# Patient Record
Sex: FEMALE | Race: WHITE | NOT HISPANIC OR LATINO | Employment: FULL TIME | URBAN - METROPOLITAN AREA
[De-identification: names, ages, dates, MRNs, and addresses within clinical notes are randomized per-mention and may not be internally consistent; named-entity substitution may affect disease eponyms.]

---

## 2017-05-31 ENCOUNTER — HISTORICAL (OUTPATIENT)
Dept: LAB | Facility: HOSPITAL | Age: 27
End: 2017-05-31

## 2017-11-30 ENCOUNTER — HISTORICAL (OUTPATIENT)
Dept: LAB | Facility: HOSPITAL | Age: 27
End: 2017-11-30

## 2017-11-30 LAB
APPEARANCE, UA: CLEAR
BACTERIA SPEC CULT: ABNORMAL /HPF
BILIRUB UR QL STRIP: NEGATIVE
COLOR UR: YELLOW
GLUCOSE (UA): NEGATIVE
HAV IGM SERPL QL IA: NEGATIVE
HBV CORE IGM SERPL QL IA: NEGATIVE
HBV SURFACE AG SERPL QL IA: NEGATIVE
HCV AB SERPL QL IA: NEGATIVE
HEPATITIS PANEL INTERP: NORMAL
HGB UR QL STRIP: NEGATIVE
HIV 1+2 AB+HIV1 P24 AG SERPL QL IA: NEGATIVE
KETONES UR QL STRIP: ABNORMAL
LEUKOCYTE ESTERASE UR QL STRIP: NEGATIVE
NITRITE UR QL STRIP: NEGATIVE
PH UR STRIP: 7 [PH] (ref 5–9)
PROT UR QL STRIP: NEGATIVE
RBC #/AREA URNS HPF: ABNORMAL /[HPF]
RPR SER QL: NORMAL
SP GR UR STRIP: 1.02 (ref 1–1.03)
SQUAMOUS EPITHELIAL, UA: ABNORMAL
UA WBC MAN: ABNORMAL
UROBILINOGEN UR STRIP-ACNC: 0.2
WBC #/AREA URNS HPF: ABNORMAL /[HPF]

## 2018-05-31 ENCOUNTER — HISTORICAL (OUTPATIENT)
Dept: ADMINISTRATIVE | Facility: HOSPITAL | Age: 28
End: 2018-05-31

## 2018-05-31 LAB
ABS NEUT (OLG): 2.38 X10(3)/MCL (ref 2.1–9.2)
ALBUMIN SERPL-MCNC: 3.5 GM/DL (ref 3.4–5)
ALBUMIN/GLOB SERPL: 1.1 {RATIO}
ALP SERPL-CCNC: 62 UNIT/L (ref 38–126)
ALT SERPL-CCNC: 35 UNIT/L (ref 12–78)
AST SERPL-CCNC: 27 UNIT/L (ref 15–37)
BASOPHILS # BLD AUTO: 0 X10(3)/MCL (ref 0–0.2)
BASOPHILS NFR BLD AUTO: 1 %
BILIRUB SERPL-MCNC: 0.4 MG/DL (ref 0.2–1)
BILIRUBIN DIRECT+TOT PNL SERPL-MCNC: 0.1 MG/DL (ref 0–0.2)
BILIRUBIN DIRECT+TOT PNL SERPL-MCNC: 0.3 MG/DL (ref 0–0.8)
BUN SERPL-MCNC: 14 MG/DL (ref 7–18)
CALCIUM SERPL-MCNC: 8.6 MG/DL (ref 8.5–10.1)
CHLORIDE SERPL-SCNC: 112 MMOL/L (ref 98–107)
CHOLEST SERPL-MCNC: 189 MG/DL (ref 0–200)
CHOLEST/HDLC SERPL: 3 {RATIO} (ref 0–4)
CO2 SERPL-SCNC: 28 MMOL/L (ref 21–32)
CREAT SERPL-MCNC: 0.9 MG/DL (ref 0.55–1.02)
EOSINOPHIL # BLD AUTO: 0.1 X10(3)/MCL (ref 0–0.9)
EOSINOPHIL NFR BLD AUTO: 2 %
ERYTHROCYTE [DISTWIDTH] IN BLOOD BY AUTOMATED COUNT: 11.8 % (ref 11.5–17)
EST. AVERAGE GLUCOSE BLD GHB EST-MCNC: 100 MG/DL
GLOBULIN SER-MCNC: 3.3 GM/DL (ref 2.4–3.5)
GLUCOSE SERPL-MCNC: 75 MG/DL (ref 74–106)
HBA1C MFR BLD: 5.1 % (ref 4.2–6.3)
HCT VFR BLD AUTO: 45 % (ref 37–47)
HDLC SERPL-MCNC: 63 MG/DL (ref 35–60)
HGB BLD-MCNC: 14.6 GM/DL (ref 12–16)
LDLC SERPL CALC-MCNC: 116 MG/DL (ref 0–129)
LYMPHOCYTES # BLD AUTO: 2 X10(3)/MCL (ref 0.6–4.6)
LYMPHOCYTES NFR BLD AUTO: 40 %
MCH RBC QN AUTO: 33.2 PG (ref 27–31)
MCHC RBC AUTO-ENTMCNC: 32.4 GM/DL (ref 33–36)
MCV RBC AUTO: 102.3 FL (ref 80–94)
MONOCYTES # BLD AUTO: 0.6 X10(3)/MCL (ref 0.1–1.3)
MONOCYTES NFR BLD AUTO: 11 %
NEUTROPHILS # BLD AUTO: 2.38 X10(3)/MCL (ref 2.1–9.2)
NEUTROPHILS NFR BLD AUTO: 46 %
PLATELET # BLD AUTO: 184 X10(3)/MCL (ref 130–400)
PMV BLD AUTO: 10 FL (ref 9.4–12.4)
POTASSIUM SERPL-SCNC: 4.4 MMOL/L (ref 3.5–5.1)
PROT SERPL-MCNC: 6.8 GM/DL (ref 6.4–8.2)
RBC # BLD AUTO: 4.4 X10(6)/MCL (ref 4.2–5.4)
SODIUM SERPL-SCNC: 145 MMOL/L (ref 136–145)
TRIGL SERPL-MCNC: 51 MG/DL (ref 30–150)
VLDLC SERPL CALC-MCNC: 10 MG/DL
WBC # SPEC AUTO: 5.2 X10(3)/MCL (ref 4.5–11.5)

## 2019-06-07 ENCOUNTER — HISTORICAL (OUTPATIENT)
Dept: ADMINISTRATIVE | Facility: HOSPITAL | Age: 29
End: 2019-06-07

## 2019-06-07 LAB
ABS NEUT (OLG): 4.82 X10(3)/MCL (ref 2.1–9.2)
ALBUMIN SERPL-MCNC: 3.9 GM/DL (ref 3.4–5)
ALBUMIN/GLOB SERPL: 1.1 RATIO (ref 1.1–2)
ALP SERPL-CCNC: 58 UNIT/L (ref 38–126)
ALT SERPL-CCNC: 26 UNIT/L (ref 12–78)
APPEARANCE, UA: CLEAR
AST SERPL-CCNC: 18 UNIT/L (ref 15–37)
BACTERIA SPEC CULT: ABNORMAL /HPF
BASOPHILS # BLD AUTO: 0.1 X10(3)/MCL (ref 0–0.2)
BASOPHILS NFR BLD AUTO: 1 %
BILIRUB SERPL-MCNC: 0.4 MG/DL (ref 0.2–1)
BILIRUB UR QL STRIP: NEGATIVE
BILIRUBIN DIRECT+TOT PNL SERPL-MCNC: 0.1 MG/DL (ref 0–0.5)
BILIRUBIN DIRECT+TOT PNL SERPL-MCNC: 0.3 MG/DL (ref 0–0.8)
BUN SERPL-MCNC: 12 MG/DL (ref 7–18)
CALCIUM SERPL-MCNC: 8.8 MG/DL (ref 8.5–10.1)
CHLORIDE SERPL-SCNC: 107 MMOL/L (ref 98–107)
CHOLEST SERPL-MCNC: 167 MG/DL (ref 0–200)
CHOLEST/HDLC SERPL: 2.2 {RATIO} (ref 0–4)
CO2 SERPL-SCNC: 29 MMOL/L (ref 21–32)
COLOR UR: YELLOW
CREAT SERPL-MCNC: 0.88 MG/DL (ref 0.55–1.02)
DEPRECATED CALCIDIOL+CALCIFEROL SERPL-MC: 46.18 NG/ML (ref 30–80)
EOSINOPHIL # BLD AUTO: 0 X10(3)/MCL (ref 0–0.9)
EOSINOPHIL NFR BLD AUTO: 1 %
ERYTHROCYTE [DISTWIDTH] IN BLOOD BY AUTOMATED COUNT: 11.2 % (ref 11.5–17)
EST. AVERAGE GLUCOSE BLD GHB EST-MCNC: 88 MG/DL
GLOBULIN SER-MCNC: 3.6 GM/DL (ref 2.4–3.5)
GLUCOSE (UA): NEGATIVE
GLUCOSE SERPL-MCNC: 79 MG/DL (ref 74–106)
HBA1C MFR BLD: 4.7 % (ref 4.2–6.3)
HCT VFR BLD AUTO: 43.3 % (ref 37–47)
HDLC SERPL-MCNC: 75 MG/DL (ref 35–60)
HGB BLD-MCNC: 14.3 GM/DL (ref 12–16)
HGB UR QL STRIP: NEGATIVE
KETONES UR QL STRIP: NEGATIVE
LDLC SERPL CALC-MCNC: 81 MG/DL (ref 0–129)
LEUKOCYTE ESTERASE UR QL STRIP: ABNORMAL
LYMPHOCYTES # BLD AUTO: 1.6 X10(3)/MCL (ref 0.6–4.6)
LYMPHOCYTES NFR BLD AUTO: 22 %
MCH RBC QN AUTO: 32.9 PG (ref 27–31)
MCHC RBC AUTO-ENTMCNC: 33 GM/DL (ref 33–36)
MCV RBC AUTO: 99.5 FL (ref 80–94)
MONOCYTES # BLD AUTO: 0.6 X10(3)/MCL (ref 0.1–1.3)
MONOCYTES NFR BLD AUTO: 8 %
NEUTROPHILS # BLD AUTO: 4.82 X10(3)/MCL (ref 2.1–9.2)
NEUTROPHILS NFR BLD AUTO: 68 %
NITRITE UR QL STRIP: NEGATIVE
PH UR STRIP: 6 [PH] (ref 5–9)
PLATELET # BLD AUTO: 190 X10(3)/MCL (ref 130–400)
PMV BLD AUTO: 10 FL (ref 9.4–12.4)
POTASSIUM SERPL-SCNC: 4.4 MMOL/L (ref 3.5–5.1)
PROT SERPL-MCNC: 7.5 GM/DL (ref 6.4–8.2)
PROT UR QL STRIP: NEGATIVE
RBC # BLD AUTO: 4.35 X10(6)/MCL (ref 4.2–5.4)
RBC #/AREA URNS HPF: ABNORMAL /[HPF]
SODIUM SERPL-SCNC: 140 MMOL/L (ref 136–145)
SP GR UR STRIP: 1.02 (ref 1–1.03)
SQUAMOUS EPITHELIAL, UA: ABNORMAL
TRIGL SERPL-MCNC: 53 MG/DL (ref 30–150)
TSH SERPL-ACNC: 1.15 MIU/L (ref 0.36–3.74)
UROBILINOGEN UR STRIP-ACNC: 0.2
VLDLC SERPL CALC-MCNC: 11 MG/DL
WBC # SPEC AUTO: 7.1 X10(3)/MCL (ref 4.5–11.5)
WBC #/AREA URNS HPF: ABNORMAL /[HPF]

## 2020-06-15 ENCOUNTER — HISTORICAL (OUTPATIENT)
Dept: ADMINISTRATIVE | Facility: HOSPITAL | Age: 30
End: 2020-06-15

## 2020-06-15 LAB
ABS NEUT (OLG): 1.88 X10(3)/MCL (ref 2.1–9.2)
ALBUMIN SERPL-MCNC: 4 GM/DL (ref 3.5–5)
ALBUMIN/GLOB SERPL: 1.3 RATIO (ref 1.1–2)
ALP SERPL-CCNC: 45 UNIT/L (ref 40–150)
ALT SERPL-CCNC: 16 UNIT/L (ref 0–55)
APPEARANCE, UA: CLEAR
AST SERPL-CCNC: 18 UNIT/L (ref 5–34)
BACTERIA SPEC CULT: ABNORMAL /HPF
BASOPHILS # BLD AUTO: 0 X10(3)/MCL (ref 0–0.2)
BASOPHILS NFR BLD AUTO: 1 %
BILIRUB SERPL-MCNC: 0.5 MG/DL
BILIRUB UR QL STRIP: NEGATIVE
BILIRUBIN DIRECT+TOT PNL SERPL-MCNC: 0.2 MG/DL (ref 0–0.5)
BILIRUBIN DIRECT+TOT PNL SERPL-MCNC: 0.3 MG/DL (ref 0–0.8)
BUN SERPL-MCNC: 9.6 MG/DL (ref 7–18.7)
CALCIUM SERPL-MCNC: 8.9 MG/DL (ref 8.4–10.2)
CHLORIDE SERPL-SCNC: 107 MMOL/L (ref 98–107)
CHOLEST SERPL-MCNC: 193 MG/DL
CHOLEST/HDLC SERPL: 3 {RATIO} (ref 0–5)
CO2 SERPL-SCNC: 25 MMOL/L (ref 22–29)
COLOR UR: YELLOW
CREAT SERPL-MCNC: 0.98 MG/DL (ref 0.55–1.02)
EOSINOPHIL # BLD AUTO: 0.1 X10(3)/MCL (ref 0–0.9)
EOSINOPHIL NFR BLD AUTO: 2 %
ERYTHROCYTE [DISTWIDTH] IN BLOOD BY AUTOMATED COUNT: 11.5 % (ref 11.5–17)
EST. AVERAGE GLUCOSE BLD GHB EST-MCNC: 88.2 MG/DL
GLOBULIN SER-MCNC: 3 GM/DL (ref 2.4–3.5)
GLUCOSE (UA): NEGATIVE
GLUCOSE SERPL-MCNC: 84 MG/DL (ref 74–100)
HBA1C MFR BLD: 4.7 %
HCT VFR BLD AUTO: 44.6 % (ref 37–47)
HDLC SERPL-MCNC: 57 MG/DL (ref 35–60)
HGB BLD-MCNC: 14.7 GM/DL (ref 12–16)
HGB UR QL STRIP: NEGATIVE
IMM GRANULOCYTES # BLD AUTO: 0 10*3/UL
IMM GRANULOCYTES NFR BLD AUTO: 0 %
KETONES UR QL STRIP: NEGATIVE
LDLC SERPL CALC-MCNC: 121 MG/DL (ref 50–140)
LEUKOCYTE ESTERASE UR QL STRIP: ABNORMAL
LYMPHOCYTES # BLD AUTO: 2 X10(3)/MCL (ref 0.6–4.6)
LYMPHOCYTES NFR BLD AUTO: 45 %
MCH RBC QN AUTO: 32.9 PG (ref 27–31)
MCHC RBC AUTO-ENTMCNC: 33 GM/DL (ref 33–36)
MCV RBC AUTO: 99.8 FL (ref 80–94)
MONOCYTES # BLD AUTO: 0.5 X10(3)/MCL (ref 0.1–1.3)
MONOCYTES NFR BLD AUTO: 10 %
NEUTROPHILS # BLD AUTO: 1.88 X10(3)/MCL (ref 2.1–9.2)
NEUTROPHILS NFR BLD AUTO: 42 %
NITRITE UR QL STRIP: NEGATIVE
PH UR STRIP: 5 [PH] (ref 5–9)
PLATELET # BLD AUTO: 187 X10(3)/MCL (ref 130–400)
PMV BLD AUTO: 10.1 FL (ref 9.4–12.4)
POTASSIUM SERPL-SCNC: 4.3 MMOL/L (ref 3.5–5.1)
PROT SERPL-MCNC: 7 GM/DL (ref 6.4–8.3)
PROT UR QL STRIP: NEGATIVE
RBC # BLD AUTO: 4.47 X10(6)/MCL (ref 4.2–5.4)
RBC #/AREA URNS HPF: ABNORMAL /[HPF]
SODIUM SERPL-SCNC: 140 MMOL/L (ref 136–145)
SP GR UR STRIP: 1.02 (ref 1–1.03)
SQUAMOUS EPITHELIAL, UA: ABNORMAL
TRIGL SERPL-MCNC: 77 MG/DL (ref 37–140)
TSH SERPL-ACNC: 1.65 UIU/ML (ref 0.35–4.94)
UROBILINOGEN UR STRIP-ACNC: 0.2
VIT B12 SERPL-MCNC: 350 PG/ML (ref 213–816)
VLDLC SERPL CALC-MCNC: 15 MG/DL
WBC # SPEC AUTO: 4.5 X10(3)/MCL (ref 4.5–11.5)
WBC #/AREA URNS HPF: ABNORMAL /[HPF]

## 2021-06-14 ENCOUNTER — HISTORICAL (OUTPATIENT)
Dept: ADMINISTRATIVE | Facility: HOSPITAL | Age: 31
End: 2021-06-14

## 2022-04-07 ENCOUNTER — HISTORICAL (OUTPATIENT)
Dept: ADMINISTRATIVE | Facility: HOSPITAL | Age: 32
End: 2022-04-07

## 2022-04-23 VITALS
DIASTOLIC BLOOD PRESSURE: 54 MMHG | HEIGHT: 68 IN | SYSTOLIC BLOOD PRESSURE: 98 MMHG | WEIGHT: 140.63 LBS | OXYGEN SATURATION: 97 % | BODY MASS INDEX: 21.31 KG/M2

## 2022-06-13 ENCOUNTER — TELEPHONE (OUTPATIENT)
Dept: INTERNAL MEDICINE | Facility: CLINIC | Age: 32
End: 2022-06-13

## 2022-06-13 DIAGNOSIS — Z13.21 ENCOUNTER FOR VITAMIN DEFICIENCY SCREENING: ICD-10-CM

## 2022-06-13 DIAGNOSIS — Z13.29 SCREENING FOR HYPOTHYROIDISM: ICD-10-CM

## 2022-06-13 DIAGNOSIS — Z00.00 WELL ADULT EXAM: Primary | ICD-10-CM

## 2022-06-13 NOTE — TELEPHONE ENCOUNTER
----- Message from Carlitos Coughlin MA sent at 6/13/2022  9:00 AM CDT -----  Regarding: PV 6/20/22 @ 3:40 Dr. Lora  1. Are there any outstanding tasks in the patient's chart? Yes, fasting labs    2. Is there any documentation in the chart? No    3.Has patient been seen in an ER, Urgent care clinic, or been admitted since last visit?  If yes, When, where, and why    4. Has patient seen any other healthcare providers since last visit?  If yes, when, where, and why    5. Has patient had any bloodwork or XR done since last visit?    6. Is patient signed up for patient portal?

## 2022-06-17 RX ORDER — DAPSONE 75 MG/G
GEL TOPICAL DAILY
COMMUNITY
Start: 2021-06-16

## 2022-06-17 RX ORDER — KETOCONAZOLE 20 MG/G
CREAM TOPICAL 2 TIMES DAILY
COMMUNITY
Start: 2021-06-16

## 2022-06-17 RX ORDER — AZELAIC ACID 0.15 G/G
GEL TOPICAL 2 TIMES DAILY
COMMUNITY
Start: 2021-06-16

## 2022-06-20 ENCOUNTER — OFFICE VISIT (OUTPATIENT)
Dept: INTERNAL MEDICINE | Facility: CLINIC | Age: 32
End: 2022-06-20
Payer: COMMERCIAL

## 2022-06-20 VITALS
HEART RATE: 67 BPM | DIASTOLIC BLOOD PRESSURE: 74 MMHG | OXYGEN SATURATION: 98 % | TEMPERATURE: 98 F | RESPIRATION RATE: 14 BRPM | BODY MASS INDEX: 21.8 KG/M2 | HEIGHT: 68 IN | WEIGHT: 143.88 LBS | SYSTOLIC BLOOD PRESSURE: 118 MMHG

## 2022-06-20 DIAGNOSIS — R47.01 APHASIA: ICD-10-CM

## 2022-06-20 DIAGNOSIS — G90.9 AUTONOMIC DYSFUNCTION: ICD-10-CM

## 2022-06-20 DIAGNOSIS — E55.9 VITAMIN D DEFICIENCY: ICD-10-CM

## 2022-06-20 DIAGNOSIS — Z00.00 ANNUAL PHYSICAL EXAM: Primary | ICD-10-CM

## 2022-06-20 DIAGNOSIS — G45.9 TRANSIENT CEREBRAL ISCHEMIA, UNSPECIFIED TYPE: ICD-10-CM

## 2022-06-20 PROCEDURE — 3074F SYST BP LT 130 MM HG: CPT | Mod: CPTII,,, | Performed by: INTERNAL MEDICINE

## 2022-06-20 PROCEDURE — 3078F PR MOST RECENT DIASTOLIC BLOOD PRESSURE < 80 MM HG: ICD-10-PCS | Mod: CPTII,,, | Performed by: INTERNAL MEDICINE

## 2022-06-20 PROCEDURE — 99395 PR PREVENTIVE VISIT,EST,18-39: ICD-10-PCS | Mod: ,,, | Performed by: INTERNAL MEDICINE

## 2022-06-20 PROCEDURE — 3078F DIAST BP <80 MM HG: CPT | Mod: CPTII,,, | Performed by: INTERNAL MEDICINE

## 2022-06-20 PROCEDURE — 3008F BODY MASS INDEX DOCD: CPT | Mod: CPTII,,, | Performed by: INTERNAL MEDICINE

## 2022-06-20 PROCEDURE — 3008F PR BODY MASS INDEX (BMI) DOCUMENTED: ICD-10-PCS | Mod: CPTII,,, | Performed by: INTERNAL MEDICINE

## 2022-06-20 PROCEDURE — 1159F PR MEDICATION LIST DOCUMENTED IN MEDICAL RECORD: ICD-10-PCS | Mod: CPTII,,, | Performed by: INTERNAL MEDICINE

## 2022-06-20 PROCEDURE — 99395 PREV VISIT EST AGE 18-39: CPT | Mod: ,,, | Performed by: INTERNAL MEDICINE

## 2022-06-20 PROCEDURE — 1160F RVW MEDS BY RX/DR IN RCRD: CPT | Mod: CPTII,,, | Performed by: INTERNAL MEDICINE

## 2022-06-20 PROCEDURE — 3074F PR MOST RECENT SYSTOLIC BLOOD PRESSURE < 130 MM HG: ICD-10-PCS | Mod: CPTII,,, | Performed by: INTERNAL MEDICINE

## 2022-06-20 PROCEDURE — 1159F MED LIST DOCD IN RCRD: CPT | Mod: CPTII,,, | Performed by: INTERNAL MEDICINE

## 2022-06-20 PROCEDURE — 1160F PR REVIEW ALL MEDS BY PRESCRIBER/CLIN PHARMACIST DOCUMENTED: ICD-10-PCS | Mod: CPTII,,, | Performed by: INTERNAL MEDICINE

## 2022-06-20 RX ORDER — FLUDROCORTISONE ACETATE 0.1 MG/1
100 TABLET ORAL 2 TIMES DAILY
COMMUNITY
Start: 2022-03-23

## 2022-06-20 RX ORDER — LEVONORGESTREL AND ETHINYL ESTRADIOL 0.1-0.02MG
1 KIT ORAL DAILY
COMMUNITY
Start: 2022-06-10 | End: 2024-03-21

## 2022-06-20 RX ORDER — DOXYCYCLINE 100 MG/1
100 CAPSULE ORAL DAILY
COMMUNITY
Start: 2022-01-18

## 2022-06-20 RX ORDER — DEXTROAMPHETAMINE SACCHARATE, AMPHETAMINE ASPARTATE MONOHYDRATE, DEXTROAMPHETAMINE SULFATE AND AMPHETAMINE SULFATE 3.75; 3.75; 3.75; 3.75 MG/1; MG/1; MG/1; MG/1
15 CAPSULE, EXTENDED RELEASE ORAL DAILY
COMMUNITY
End: 2023-06-28

## 2022-06-20 NOTE — PROGRESS NOTES
Subjective:      Patient ID: Huyen Dover is a 31 y.o. female.    Chief Complaint: Annual Exam      HPI:    Huyen is a 31-year-old  female  She works as a  at [employer]; in documentation.  Otherwise her parents are alive her dad is 62 with prostate cancer and has had prostatectomy, radiation.   Mom has history of hyperlipidemia, alcoholic, sober living  Brother no medical problems  Avni; autonomic dysfunction. She is now on Florinef 0.1 mg once a day as needed  [facility] for Rosacea  Had COVID last year  COVID vaccinated  MCV at 99; drinks 1-2 glasses of wine a week  [facility] [facility]  She complains of difficulty with expressive aphasia that she will often have the word and that it on the tip of her tongue with the wrong word will come out.  She is under lot of stress, on her mother is living in a sober living situation and she is planning a wedding.  She has had COVID twice.      Problem List Items Addressed This Visit        Neuro    Aphasia    Relevant Orders    Iron    Vitamin B12    Folate    TSH    Autonomic dysfunction    Relevant Orders    Iron    Vitamin B12    Folate       Endocrine    Vitamin D deficiency    Relevant Orders    Vitamin D       Other    Annual physical exam - Primary    Current Assessment & Plan     General health maintenance education given, labs ordered  Remainder of age-appropriate exams are up-to-date             Other Visit Diagnoses     Transient cerebral ischemia, unspecified type        Relevant Orders    CT Head Without Contrast              Past Medical History:  Past Medical History:   Diagnosis Date    ADD (attention deficit disorder)     Dizziness      History reviewed. No pertinent surgical history.  Review of patient's allergies indicates:  No Known Allergies  Current Outpatient Medications on File Prior to Visit   Medication Sig Dispense Refill    azelaic acid (AZELEX) 15 % gel Apply topically 2 (two) times a day.      dapsone  (ACZONE) 7.5 % GlwP Apply topically once daily.      doxycycline (VIBRAMYCIN) 100 MG Cap Take 100 mg by mouth once daily.      fludrocortisone (FLORINEF) 0.1 mg Tab Take 100 mcg by mouth 2 (two) times daily.      ketoconazole (NIZORAL) 2 % cream Apply topically 2 (two) times a day.      LESSINA 0.1-20 mg-mcg per tablet Take 1 tablet by mouth once daily.      dextroamphetamine-amphetamine (ADDERALL XR) 15 MG 24 hr capsule Take 15 mg by mouth Daily.       No current facility-administered medications on file prior to visit.     Social History     Socioeconomic History    Marital status: Unknown   Tobacco Use    Smoking status: Never Smoker    Smokeless tobacco: Never Used   Substance and Sexual Activity    Alcohol use: Yes     Comment: Occassionally    Drug use: Never    Sexual activity: Yes     Partners: Male     Family History   Problem Relation Age of Onset    Alcohol abuse Mother     Depression Mother     Hypertension Father     Hyperlipidemia Father     Prostate cancer Father            Review of Systems  Constitutional: No fever,  no fatigue, no chills, no night sweats, no weight gain, no weight loss, no changes in appetite.   Eye: No redness, no acute vision loss, no blurred vision, no double vision, no eye pain  ENMT: No sore throat, no nasal drainage, no nose bleeds,  no headache, no ear pain, no ear drainage, no acute hearing loss  Respiratory: No cough, no sputum production, no shortness of breath, no hemoptysis, no wheezing.  Cardiovascular: No chest pain, no chest tightness, no MORALES, no PND, no orthopnea, no swelling, no palpitations.  Gastrointestinal: No abdominal pain, no nausea, no vomiting, no diarrhea, no constipation, no difficulty swallowing, no change in bowel habits, no rectal bleeding  Genitourinary: no urgency, no frequency, no burning or pain when urinating, no blood in urine, no incontinence  Heme/Lymph: No easy bruising and/or bleeding, no swollen or painful  "glands.  Endocrine: No polyuria, no polydipsia, no polyphagia, no heat or cold intolerance.  Musculoskeletal: No muscle pain, no muscle weakness, no joint pain, no red or swollen joints.  Integumentary: No rash, no pruritis, no hair or nail changes.  Neurologic: No dizziness, no fainting, no tremors, no tingling and/ or numbness.  Psychiatric: No anxiety, no depression, no memory loss  All Other ROS: Negative with exception of what is documented in the history of present illness     Objective:   /74 (BP Location: Left arm, Patient Position: Sitting, BP Method: Medium (Manual))   Pulse 67   Temp 98.1 °F (36.7 °C) (Temporal)   Resp 14   Ht 5' 8" (1.727 m)   Wt 65.3 kg (143 lb 14.4 oz)   LMP 06/15/2022   SpO2 98%   BMI 21.88 kg/m²     Physical Exam  General : Alert and oriented, No acute distress, well, developed, well nourished, afebrile   Eye : PERRLA. EOMI.   HEENT : Normocephalic. Neck supple. Normal hearing. Oral mucosa is moist.  Respiratory : Lungs are clear to auscultation bilaterally, non-labored. Symmetrical chest wall expansion. No crackles, wheeze, or rhonci.  Cardiovascular : Normal rate, Regular rhythm. No murmurs, rubs, or gallops. Pulses 2+ in all extremities. No Edema.  Gastrointestinal : Soft, nontender, non-distended, bowel sounds normal, no organomegaly, no guarding, no rebound.  Musculoskeletal : Normal ROM.  No muscle tenderness.  Integumentary : Warm to touch. Intact. No rash.    Neurologic : Alert and oriented. No focal deficits.   Psychiatric : Cooperative, Appropriate mood & affect. Normal judgment.          Assessment:     1. Annual physical exam    2. Transient cerebral ischemia, unspecified type    3. Aphasia    4. Autonomic dysfunction    5. Vitamin D deficiency                  Plan:       I am having CARRIE Dover maintain her azelaic acid, dapsone, ketoconazole, dextroamphetamine-amphetamine, fludrocortisone, LESSINA, and doxycycline.      Problem List Items Addressed " This Visit        Neuro    Aphasia    Relevant Orders    Iron    Vitamin B12    Folate    TSH    Autonomic dysfunction    Relevant Orders    Iron    Vitamin B12    Folate       Endocrine    Vitamin D deficiency    Relevant Orders    Vitamin D       Other    Annual physical exam - Primary     General health maintenance education given, labs ordered  Remainder of age-appropriate exams are up-to-date             Other Visit Diagnoses     Transient cerebral ischemia, unspecified type        Relevant Orders    CT Head Without Contrast            Huyen was seen today for annual exam.    Diagnoses and all orders for this visit:    Annual physical exam    Transient cerebral ischemia, unspecified type  -     CT Head Without Contrast; Future    Aphasia  -     Iron; Future  -     Vitamin B12; Future  -     Folate; Future  -     TSH; Future    Autonomic dysfunction  -     Iron; Future  -     Vitamin B12; Future  -     Folate; Future    Vitamin D deficiency  -     Vitamin D; Future               [unfilled]  Orders Placed This Encounter   Procedures    CT Head Without Contrast     Standing Status:   Future     Standing Expiration Date:   6/20/2023     Order Specific Question:   May the Radiologist modify the order per protocol to meet the clinical needs of the patient?     Answer:   Yes    Iron     Standing Status:   Future     Standing Expiration Date:   8/19/2023    Vitamin B12     Standing Status:   Future     Standing Expiration Date:   8/19/2023    Folate     Standing Status:   Future     Standing Expiration Date:   8/19/2023    TSH     Standing Status:   Future     Standing Expiration Date:   8/19/2023    Vitamin D     Standing Status:   Future     Standing Expiration Date:   8/19/2023       Medication List with Changes/Refills   Current Medications    AZELAIC ACID (AZELEX) 15 % GEL    Apply topically 2 (two) times a day.    DAPSONE (ACZONE) 7.5 % GLWP    Apply topically once daily.    DEXTROAMPHETAMINE-AMPHETAMINE  (ADDERALL XR) 15 MG 24 HR CAPSULE    Take 15 mg by mouth Daily.    DOXYCYCLINE (VIBRAMYCIN) 100 MG CAP    Take 100 mg by mouth once daily.    FLUDROCORTISONE (FLORINEF) 0.1 MG TAB    Take 100 mcg by mouth 2 (two) times daily.    KETOCONAZOLE (NIZORAL) 2 % CREAM    Apply topically 2 (two) times a day.    LESSINA 0.1-20 MG-MCG PER TABLET    Take 1 tablet by mouth once daily.      Medication List with Changes/Refills   Current Medications    AZELAIC ACID (AZELEX) 15 % GEL    Apply topically 2 (two) times a day.       Start Date: 6/16/2021 End Date: --    DAPSONE (ACZONE) 7.5 % GLWP    Apply topically once daily.       Start Date: 6/16/2021 End Date: --    DEXTROAMPHETAMINE-AMPHETAMINE (ADDERALL XR) 15 MG 24 HR CAPSULE    Take 15 mg by mouth Daily.       Start Date: --        End Date: --    DOXYCYCLINE (VIBRAMYCIN) 100 MG CAP    Take 100 mg by mouth once daily.       Start Date: 1/18/2022 End Date: --    FLUDROCORTISONE (FLORINEF) 0.1 MG TAB    Take 100 mcg by mouth 2 (two) times daily.       Start Date: 3/23/2022 End Date: --    KETOCONAZOLE (NIZORAL) 2 % CREAM    Apply topically 2 (two) times a day.       Start Date: 6/16/2021 End Date: --    LESSINA 0.1-20 MG-MCG PER TABLET    Take 1 tablet by mouth once daily.       Start Date: 6/10/2022 End Date: --            Follow up in 1 year (on 6/20/2023) for with labs prior to visit, WELLNESS.

## 2022-06-20 NOTE — ASSESSMENT & PLAN NOTE
General health maintenance education given, labs ordered  Remainder of age-appropriate exams are up-to-date

## 2022-06-28 ENCOUNTER — TELEPHONE (OUTPATIENT)
Dept: INTERNAL MEDICINE | Facility: CLINIC | Age: 32
End: 2022-06-28
Payer: COMMERCIAL

## 2022-06-28 ENCOUNTER — HOSPITAL ENCOUNTER (OUTPATIENT)
Dept: RADIOLOGY | Facility: HOSPITAL | Age: 32
Discharge: HOME | End: 2022-06-28
Attending: INTERNAL MEDICINE
Payer: COMMERCIAL

## 2022-06-28 DIAGNOSIS — G45.9 TRANSIENT CEREBRAL ISCHEMIA, UNSPECIFIED TYPE: ICD-10-CM

## 2022-06-28 PROCEDURE — 70450 CT HEAD/BRAIN W/O DYE: CPT | Mod: TC

## 2022-06-28 NOTE — TELEPHONE ENCOUNTER
----- Message from Ruddy Tavera MD sent at 6/28/2022 10:52 AM CDT -----  Your labs have been reviewed.  They are excellent.    Continue current treatment plan and follow up as scheduled.    Any other lab values out of the normal reference range are clinically insignificant and require no further work up.       Thank you,  Dr. Ruddy Lora

## 2022-06-28 NOTE — TELEPHONE ENCOUNTER
----- Message from Ruddy Tavera MD sent at 6/28/2022 10:52 AM CDT -----  CT of the head reviewed with no acute intracranial findings.

## 2022-09-19 PROBLEM — Z00.00 ANNUAL PHYSICAL EXAM: Status: RESOLVED | Noted: 2022-06-20 | Resolved: 2022-09-19

## 2023-06-21 ENCOUNTER — TELEPHONE (OUTPATIENT)
Dept: INTERNAL MEDICINE | Facility: CLINIC | Age: 33
End: 2023-06-21
Payer: COMMERCIAL

## 2023-06-21 DIAGNOSIS — Z00.00 WELLNESS EXAMINATION: Primary | ICD-10-CM

## 2023-06-21 DIAGNOSIS — E55.9 VITAMIN D DEFICIENCY: ICD-10-CM

## 2023-06-21 DIAGNOSIS — Z13.29 SCREENING FOR HYPOTHYROIDISM: ICD-10-CM

## 2023-06-21 NOTE — TELEPHONE ENCOUNTER
----- Message from Carlitos Coughlin MA sent at 6/21/2023 10:50 AM CDT -----  Regarding: PV 6/28/23 @ 3:40 Dr. Lora  1. Are there any outstanding tasks in the patient's chart? Yes, fasting labs    2. Is there any documentation in the chart? No    3.Has patient been seen in an ER, Urgent care clinic, or been admitted since last visit?  If yes, When, where, and why    4. Has patient seen any other healthcare providers since last visit?  If yes, when, where, and why    5. Has patient had any bloodwork or XR done since last visit?    6. Is patient signed up for patient portal?

## 2023-06-27 ENCOUNTER — LAB VISIT (OUTPATIENT)
Dept: LAB | Facility: HOSPITAL | Age: 33
End: 2023-06-27
Attending: INTERNAL MEDICINE
Payer: COMMERCIAL

## 2023-06-27 DIAGNOSIS — E55.9 VITAMIN D DEFICIENCY: ICD-10-CM

## 2023-06-27 DIAGNOSIS — Z13.29 SCREENING FOR HYPOTHYROIDISM: ICD-10-CM

## 2023-06-27 DIAGNOSIS — Z00.00 WELLNESS EXAMINATION: ICD-10-CM

## 2023-06-27 LAB
ALBUMIN SERPL-MCNC: 4 G/DL (ref 3.5–5)
ALBUMIN/GLOB SERPL: 1.5 RATIO (ref 1.1–2)
ALP SERPL-CCNC: 53 UNIT/L (ref 40–150)
ALT SERPL-CCNC: 19 UNIT/L (ref 0–55)
APPEARANCE UR: ABNORMAL
AST SERPL-CCNC: 19 UNIT/L (ref 5–34)
BACTERIA #/AREA URNS AUTO: ABNORMAL /HPF
BASOPHILS # BLD AUTO: 0.04 X10(3)/MCL
BASOPHILS NFR BLD AUTO: 0.8 %
BILIRUB UR QL STRIP.AUTO: NEGATIVE MG/DL
BILIRUBIN DIRECT+TOT PNL SERPL-MCNC: 0.5 MG/DL
BUN SERPL-MCNC: 11.9 MG/DL (ref 7–18.7)
CALCIUM SERPL-MCNC: 9.3 MG/DL (ref 8.4–10.2)
CHLORIDE SERPL-SCNC: 107 MMOL/L (ref 98–107)
CHOLEST SERPL-MCNC: 174 MG/DL
CHOLEST/HDLC SERPL: 3 {RATIO} (ref 0–5)
CO2 SERPL-SCNC: 26 MMOL/L (ref 22–29)
COLOR UR: ABNORMAL
CREAT SERPL-MCNC: 0.94 MG/DL (ref 0.55–1.02)
DEPRECATED CALCIDIOL+CALCIFEROL SERPL-MC: 43.5 NG/ML (ref 30–80)
EOSINOPHIL # BLD AUTO: 0.1 X10(3)/MCL (ref 0–0.9)
EOSINOPHIL NFR BLD AUTO: 2.1 %
ERYTHROCYTE [DISTWIDTH] IN BLOOD BY AUTOMATED COUNT: 11.5 % (ref 11.5–17)
EST. AVERAGE GLUCOSE BLD GHB EST-MCNC: 88.2 MG/DL
GFR SERPLBLD CREATININE-BSD FMLA CKD-EPI: >60 MLS/MIN/1.73/M2
GLOBULIN SER-MCNC: 2.6 GM/DL (ref 2.4–3.5)
GLUCOSE SERPL-MCNC: 83 MG/DL (ref 74–100)
GLUCOSE UR QL STRIP.AUTO: NEGATIVE MG/DL
HBA1C MFR BLD: 4.7 %
HCT VFR BLD AUTO: 43.6 % (ref 37–47)
HDLC SERPL-MCNC: 59 MG/DL (ref 35–60)
HGB BLD-MCNC: 14.4 G/DL (ref 12–16)
IMM GRANULOCYTES # BLD AUTO: 0.01 X10(3)/MCL (ref 0–0.04)
IMM GRANULOCYTES NFR BLD AUTO: 0.2 %
KETONES UR QL STRIP.AUTO: NEGATIVE MG/DL
LDLC SERPL CALC-MCNC: 103 MG/DL (ref 50–140)
LEUKOCYTE ESTERASE UR QL STRIP.AUTO: ABNORMAL UNIT/L
LYMPHOCYTES # BLD AUTO: 1.88 X10(3)/MCL (ref 0.6–4.6)
LYMPHOCYTES NFR BLD AUTO: 38.6 %
MCH RBC QN AUTO: 32.8 PG (ref 27–31)
MCHC RBC AUTO-ENTMCNC: 33 G/DL (ref 33–36)
MCV RBC AUTO: 99.3 FL (ref 80–94)
MONOCYTES # BLD AUTO: 0.43 X10(3)/MCL (ref 0.1–1.3)
MONOCYTES NFR BLD AUTO: 8.8 %
NEUTROPHILS # BLD AUTO: 2.41 X10(3)/MCL (ref 2.1–9.2)
NEUTROPHILS NFR BLD AUTO: 49.5 %
NITRITE UR QL STRIP.AUTO: NEGATIVE
NRBC BLD AUTO-RTO: 0 %
PH UR STRIP.AUTO: 5.5 [PH]
PLATELET # BLD AUTO: 180 X10(3)/MCL (ref 130–400)
PMV BLD AUTO: 10.1 FL (ref 7.4–10.4)
POTASSIUM SERPL-SCNC: 4.3 MMOL/L (ref 3.5–5.1)
PROT SERPL-MCNC: 6.6 GM/DL (ref 6.4–8.3)
PROT UR QL STRIP.AUTO: NEGATIVE MG/DL
RBC # BLD AUTO: 4.39 X10(6)/MCL (ref 4.2–5.4)
RBC #/AREA URNS AUTO: <5 /HPF
RBC UR QL AUTO: NEGATIVE UNIT/L
SODIUM SERPL-SCNC: 140 MMOL/L (ref 136–145)
SP GR UR STRIP.AUTO: 1.02 (ref 1–1.03)
SQUAMOUS #/AREA URNS AUTO: 6 /HPF
TRIGL SERPL-MCNC: 60 MG/DL (ref 37–140)
TSH SERPL-ACNC: 2.03 UIU/ML (ref 0.35–4.94)
UROBILINOGEN UR STRIP-ACNC: 0.2 MG/DL
VLDLC SERPL CALC-MCNC: 12 MG/DL
WBC # SPEC AUTO: 4.87 X10(3)/MCL (ref 4.5–11.5)
WBC #/AREA URNS AUTO: ABNORMAL /HPF

## 2023-06-27 PROCEDURE — 80053 COMPREHEN METABOLIC PANEL: CPT

## 2023-06-27 PROCEDURE — 87088 URINE BACTERIA CULTURE: CPT

## 2023-06-27 PROCEDURE — 85025 COMPLETE CBC W/AUTO DIFF WBC: CPT

## 2023-06-27 PROCEDURE — 81001 URINALYSIS AUTO W/SCOPE: CPT

## 2023-06-27 PROCEDURE — 36415 COLL VENOUS BLD VENIPUNCTURE: CPT

## 2023-06-27 PROCEDURE — 84443 ASSAY THYROID STIM HORMONE: CPT

## 2023-06-27 PROCEDURE — 80061 LIPID PANEL: CPT

## 2023-06-27 PROCEDURE — 82306 VITAMIN D 25 HYDROXY: CPT

## 2023-06-27 PROCEDURE — 83036 HEMOGLOBIN GLYCOSYLATED A1C: CPT

## 2023-06-28 ENCOUNTER — OFFICE VISIT (OUTPATIENT)
Dept: INTERNAL MEDICINE | Facility: CLINIC | Age: 33
End: 2023-06-28
Payer: COMMERCIAL

## 2023-06-28 VITALS
TEMPERATURE: 98 F | SYSTOLIC BLOOD PRESSURE: 124 MMHG | BODY MASS INDEX: 20.76 KG/M2 | HEIGHT: 68 IN | RESPIRATION RATE: 16 BRPM | DIASTOLIC BLOOD PRESSURE: 76 MMHG | HEART RATE: 76 BPM | OXYGEN SATURATION: 98 % | WEIGHT: 137 LBS

## 2023-06-28 DIAGNOSIS — R47.01 EXPRESSIVE APHASIA: Primary | ICD-10-CM

## 2023-06-28 DIAGNOSIS — Z00.00 ANNUAL PHYSICAL EXAM: ICD-10-CM

## 2023-06-28 PROCEDURE — 1159F MED LIST DOCD IN RCRD: CPT | Mod: CPTII,,, | Performed by: INTERNAL MEDICINE

## 2023-06-28 PROCEDURE — 1160F RVW MEDS BY RX/DR IN RCRD: CPT | Mod: CPTII,,, | Performed by: INTERNAL MEDICINE

## 2023-06-28 PROCEDURE — 3008F BODY MASS INDEX DOCD: CPT | Mod: CPTII,,, | Performed by: INTERNAL MEDICINE

## 2023-06-28 PROCEDURE — 99395 PR PREVENTIVE VISIT,EST,18-39: ICD-10-PCS | Mod: ,,, | Performed by: INTERNAL MEDICINE

## 2023-06-28 PROCEDURE — 3078F PR MOST RECENT DIASTOLIC BLOOD PRESSURE < 80 MM HG: ICD-10-PCS | Mod: CPTII,,, | Performed by: INTERNAL MEDICINE

## 2023-06-28 PROCEDURE — 3074F PR MOST RECENT SYSTOLIC BLOOD PRESSURE < 130 MM HG: ICD-10-PCS | Mod: CPTII,,, | Performed by: INTERNAL MEDICINE

## 2023-06-28 PROCEDURE — 99395 PREV VISIT EST AGE 18-39: CPT | Mod: ,,, | Performed by: INTERNAL MEDICINE

## 2023-06-28 PROCEDURE — 3078F DIAST BP <80 MM HG: CPT | Mod: CPTII,,, | Performed by: INTERNAL MEDICINE

## 2023-06-28 PROCEDURE — 3074F SYST BP LT 130 MM HG: CPT | Mod: CPTII,,, | Performed by: INTERNAL MEDICINE

## 2023-06-28 PROCEDURE — 3008F PR BODY MASS INDEX (BMI) DOCUMENTED: ICD-10-PCS | Mod: CPTII,,, | Performed by: INTERNAL MEDICINE

## 2023-06-28 PROCEDURE — 1159F PR MEDICATION LIST DOCUMENTED IN MEDICAL RECORD: ICD-10-PCS | Mod: CPTII,,, | Performed by: INTERNAL MEDICINE

## 2023-06-28 PROCEDURE — 1160F PR REVIEW ALL MEDS BY PRESCRIBER/CLIN PHARMACIST DOCUMENTED: ICD-10-PCS | Mod: CPTII,,, | Performed by: INTERNAL MEDICINE

## 2023-06-28 NOTE — ASSESSMENT & PLAN NOTE
Patient presented last year with this expressive aphasia complaint.  We did MRI that time of her brain and it was normal.  She did interestingly change birth control in the past year to a birth control that gives more of a steady state of hormones which of course is not physiologically normal I advised her to maybe contact her gyn and see if she can go back to the original birth control she was on.  Also advise her to start AB complex of sublingual, prenatal with folic acid and referral to neurology just to make sure we checked all the boxes but patient with no focal deficits I have not personally seen aphasia and she states nobody from work has recognized it.  But clearly it causes her grave concern

## 2023-06-28 NOTE — ASSESSMENT & PLAN NOTE
General health maintenance education given, labs reviewed excellent  Age-appropriate exams are up-to-date

## 2023-06-28 NOTE — PROGRESS NOTES
Subjective:      Patient ID: Huyen Dover is a 32 y.o. female.    Chief Complaint: Annual Exam      HPI:  Huyen is a 32-year-old  female here for wellness  She works as a  at [employer]; in documentation.  Otherwise her parents are alive her dad is 62 with prostate cancer and has had prostatectomy, radiation.   Mom has history of hyperlipidemia, alcoholic, sober living  Brother no medical problems  Avni; autonomic dysfunction. She is now on Florinef 0.1 mg once a day as needed  [facility] for Rosacea  Had COVID last year  COVID vaccinated  MCV at 99; drinks 1-2 glasses of wine a week  [facility]  She complains of difficulty with expressive aphasia that she will often have the word and that it on the tip of her tongue with the wrong word will come out.  Her mother is living in a sober living situation  Started methylated folate by her therapist with no improvement; no history of homocystinuria  Birth control was changed during this period of time as well  MRI of the brain last year was unremarkable  MCV is borderline  B12 and folic acid levels are normal last year    Past Medical History:  Past Medical History:   Diagnosis Date    ADD (attention deficit disorder)     Dizziness      History reviewed. No pertinent surgical history.  Review of patient's allergies indicates:  No Known Allergies  Current Outpatient Medications on File Prior to Visit   Medication Sig Dispense Refill    azelaic acid (AZELEX) 15 % gel Apply topically 2 (two) times a day.      dapsone (ACZONE) 7.5 % GlwP Apply topically once daily.      doxycycline (VIBRAMYCIN) 100 MG Cap Take 100 mg by mouth once daily.      fludrocortisone (FLORINEF) 0.1 mg Tab Take 100 mcg by mouth 2 (two) times daily.      ketoconazole (NIZORAL) 2 % cream Apply topically 2 (two) times a day.      LESSINA 0.1-20 mg-mcg per tablet Take 1 tablet by mouth once daily.      [DISCONTINUED] dextroamphetamine-amphetamine (ADDERALL XR)  15 MG 24 hr capsule Take 15 mg by mouth Daily.       No current facility-administered medications on file prior to visit.     Social History     Socioeconomic History    Marital status:    Tobacco Use    Smoking status: Never    Smokeless tobacco: Never   Substance and Sexual Activity    Alcohol use: Yes     Comment: Occassionally    Drug use: Never    Sexual activity: Yes     Partners: Male     Social Determinants of Health     Financial Resource Strain: Low Risk     Difficulty of Paying Living Expenses: Not hard at all   Food Insecurity: No Food Insecurity    Worried About Running Out of Food in the Last Year: Never true    Ran Out of Food in the Last Year: Never true   Transportation Needs: No Transportation Needs    Lack of Transportation (Medical): No    Lack of Transportation (Non-Medical): No   Physical Activity: Sufficiently Active    Days of Exercise per Week: 3 days    Minutes of Exercise per Session: 60 min   Stress: No Stress Concern Present    Feeling of Stress : Not at all   Social Connections: Socially Integrated    Frequency of Communication with Friends and Family: More than three times a week    Frequency of Social Gatherings with Friends and Family: More than three times a week    Attends [employer] Services: More than 4 times per year    Active Member of Clubs or Organizations: Yes    Attends Club or Organization Meetings: More than 4 times per year    Marital Status:    Housing Stability: Unknown    Unable to Pay for Housing in the Last Year: No    Unstable Housing in the Last Year: No     Family History   Problem Relation Age of Onset    Alcohol abuse Mother     Depression Mother     Hypertension Father     Hyperlipidemia Father     Prostate cancer Father        Review of Systems  A comprehensive review of systems was performed and was negative with exception of what is documented above.     Objective:   /76 (BP Location: Left arm, Patient Position: Sitting, BP Method: Medium  "(Manual))   Pulse 76   Temp 97.7 °F (36.5 °C) (Temporal)   Resp 16   Ht 5' 8" (1.727 m)   Wt 62.1 kg (137 lb)   LMP 06/10/2023   SpO2 98%   BMI 20.83 kg/m²   Physical Exam  General : Alert and oriented, No acute distress, afebrile.  Eye : PERRLA. EOMI. Normal conjunctiva, Sclerae are nonicteric.  Respiratory : Respirations are non-labored and clear to auscultation bilaterally. Symmetrical air entry bilaterally, no crackles, no wheezes, no rhonchi. No cyanosis, no clubbing.  Cardiovascular : Normal rate, Regular rhythm. No murmurs, rubs, or gallops. Pulses are 2+ throughout. No JVD. No Edema.  Gastrointestinal : Soft, nontender, non-distended, bowel sounds are present in all quadrants, no organomegaly, no guarding, no rebound.  Musculoskeletal : Normal range of motion throughout. No muscle tenderness.  Integumentary : Warm, moist, intact.  Neurologic : Alert, Oriented  Psychiatric : Cooperative, Appropriate mood & affect.   Assessment/ Plan:   1. Expressive aphasia  Assessment & Plan:  Patient presented last year with this expressive aphasia complaint.  We did MRI that time of her brain and it was normal.  She did interestingly change birth control in the past year to a birth control that gives more of a steady state of hormones which of course is not physiologically normal I advised her to maybe contact her gyn and see if she can go back to the original birth control she was on.  Also advise her to start AB complex of sublingual, prenatal with folic acid and referral to neurology just to make sure we checked all the boxes but patient with no focal deficits I have not personally seen aphasia and she states nobody from work has recognized it.  But clearly it causes her grave concern    Orders:  -     Ambulatory referral/consult to Neurology; Future; Expected date: 07/05/2023  -     Iron and TIBC; Future; Expected date: 06/28/2023  -     Ferritin; Future; Expected date: 06/28/2023  -     Vitamin B12; Future; " Expected date: 06/28/2023  -     Folate; Future; Expected date: 06/28/2023    2. Annual physical exam  Assessment & Plan:  General health maintenance education given, labs reviewed excellent  Age-appropriate exams are up-to-date    Orders:  -     Iron and TIBC; Future; Expected date: 06/28/2023  -     Ferritin; Future; Expected date: 06/28/2023  -     CBC Auto Differential; Future; Expected date: 06/28/2024  -     Comprehensive Metabolic Panel; Future; Expected date: 06/28/2024  -     Lipid Panel; Future; Expected date: 06/28/2024  -     TSH; Future; Expected date: 06/28/2024  -     Hemoglobin A1C; Future; Expected date: 06/28/2024  -     Urinalysis; Future; Expected date: 06/28/2024  -     Vitamin B12; Future; Expected date: 06/28/2023  -     Folate; Future; Expected date: 06/28/2023             Follow up in about 1 year (around 6/28/2024) for WELLNESS, with labs prior to visit.

## 2023-06-29 LAB — BACTERIA UR CULT: NORMAL

## 2023-10-02 PROBLEM — Z00.00 ANNUAL PHYSICAL EXAM: Status: RESOLVED | Noted: 2022-06-20 | Resolved: 2023-10-02

## 2023-11-15 ENCOUNTER — OFFICE VISIT (OUTPATIENT)
Dept: NEUROLOGY | Facility: CLINIC | Age: 33
End: 2023-11-15
Payer: COMMERCIAL

## 2023-11-15 VITALS
WEIGHT: 135 LBS | HEIGHT: 68 IN | DIASTOLIC BLOOD PRESSURE: 74 MMHG | BODY MASS INDEX: 20.46 KG/M2 | SYSTOLIC BLOOD PRESSURE: 108 MMHG

## 2023-11-15 DIAGNOSIS — R47.9 SPEECH COMPLAINTS: Primary | ICD-10-CM

## 2023-11-15 DIAGNOSIS — R47.01 EXPRESSIVE APHASIA: ICD-10-CM

## 2023-11-15 PROCEDURE — 3044F PR MOST RECENT HEMOGLOBIN A1C LEVEL <7.0%: ICD-10-PCS | Mod: CPTII,S$GLB,, | Performed by: SPECIALIST

## 2023-11-15 PROCEDURE — 3044F HG A1C LEVEL LT 7.0%: CPT | Mod: CPTII,S$GLB,, | Performed by: SPECIALIST

## 2023-11-15 PROCEDURE — 99999 PR PBB SHADOW E&M-EST. PATIENT-LVL III: CPT | Mod: PBBFAC,,, | Performed by: SPECIALIST

## 2023-11-15 PROCEDURE — 1159F PR MEDICATION LIST DOCUMENTED IN MEDICAL RECORD: ICD-10-PCS | Mod: CPTII,S$GLB,, | Performed by: SPECIALIST

## 2023-11-15 PROCEDURE — 3078F DIAST BP <80 MM HG: CPT | Mod: CPTII,S$GLB,, | Performed by: SPECIALIST

## 2023-11-15 PROCEDURE — 3008F BODY MASS INDEX DOCD: CPT | Mod: CPTII,S$GLB,, | Performed by: SPECIALIST

## 2023-11-15 PROCEDURE — 3074F PR MOST RECENT SYSTOLIC BLOOD PRESSURE < 130 MM HG: ICD-10-PCS | Mod: CPTII,S$GLB,, | Performed by: SPECIALIST

## 2023-11-15 PROCEDURE — 1159F MED LIST DOCD IN RCRD: CPT | Mod: CPTII,S$GLB,, | Performed by: SPECIALIST

## 2023-11-15 PROCEDURE — 99205 PR OFFICE/OUTPT VISIT, NEW, LEVL V, 60-74 MIN: ICD-10-PCS | Mod: S$GLB,,, | Performed by: SPECIALIST

## 2023-11-15 PROCEDURE — 3008F PR BODY MASS INDEX (BMI) DOCUMENTED: ICD-10-PCS | Mod: CPTII,S$GLB,, | Performed by: SPECIALIST

## 2023-11-15 PROCEDURE — 1160F RVW MEDS BY RX/DR IN RCRD: CPT | Mod: CPTII,S$GLB,, | Performed by: SPECIALIST

## 2023-11-15 PROCEDURE — 1160F PR REVIEW ALL MEDS BY PRESCRIBER/CLIN PHARMACIST DOCUMENTED: ICD-10-PCS | Mod: CPTII,S$GLB,, | Performed by: SPECIALIST

## 2023-11-15 PROCEDURE — 99999 PR PBB SHADOW E&M-EST. PATIENT-LVL III: ICD-10-PCS | Mod: PBBFAC,,, | Performed by: SPECIALIST

## 2023-11-15 PROCEDURE — 3074F SYST BP LT 130 MM HG: CPT | Mod: CPTII,S$GLB,, | Performed by: SPECIALIST

## 2023-11-15 PROCEDURE — 3078F PR MOST RECENT DIASTOLIC BLOOD PRESSURE < 80 MM HG: ICD-10-PCS | Mod: CPTII,S$GLB,, | Performed by: SPECIALIST

## 2023-11-15 PROCEDURE — 99205 OFFICE O/P NEW HI 60 MIN: CPT | Mod: S$GLB,,, | Performed by: SPECIALIST

## 2023-11-15 NOTE — PROGRESS NOTES
"Subjective:      @Patient ID: Huyen Dover is a 32 y.o. female.    Chief Complaint: NP ref by Dr Lora for neuro cons to kyl[facility] for Expressive difficulties       HPI:            States she had a diff finding her words.    States she can have a full conversation and the forgets what she wanted to finish saying.   Pt CT Head in 2022 and it was normal.       Notes may also be on facesheet for HPI, ROS, and other sections     Review of Systems   Sleeps ok      Social History     Tobacco Use    Smoking status: Never    Smokeless tobacco: Never   Substance Use Topics    Alcohol use: Yes     Comment: Occassionally    Drug use: Never      [] Single     [x]     [] []   [x] Working      in [employer]   [] Drives     [] Does not drive     FH:  Father retired [employer] pipeline   Mother etoh in recovery  retired [employer] teacher   "Dad swears he'll have dementia;  his father  early with cancer   his mother just  at age 92 and she was sharp"       Husb a student   they  in May   [facility] 11yo with them few nights per week       ----------------------------  ADD (attention deficit disorder)  Dizziness    Current Outpatient Medications   Medication Instructions    azelaic acid (AZELEX) 15 % gel Topical, 2 times daily    dapsone (ACZONE) 7.5 % GlwP Topical, Daily    doxycycline (VIBRAMYCIN) 100 mg, Oral, Daily    fludrocortisone (FLORINEF) 100 mcg, Oral, 2 times daily    ketoconazole (NIZORAL) 2 % cream Topical, 2 times daily    LESSINA 0.1-20 mg-mcg per tablet 1 tablet, Oral, Daily      There are no discontinued medications.      Objective:        Exam:   Visit Vitals  /74   Ht 5' 8" (1.727 m)   Wt 61.2 kg (135 lb)   BMI 20.53 kg/m²       General Exam  [x] Unaccompanied   [] Accompanied, by__ []Spouse     []Child            []_____________            body habitus_ Body mass index is 20.53 kg/m².    [x]Gen exam overall unremarkable    []Abnormalities, if " present, checked     []Mental Status_alert and appropriate    []Oropharynx_Mallampati grade_1 or 2  [] OP   M3    [] M4  []Neck_ no bruits     [] Bruit   [x]Heart__ RRR s murmurs or extra beats   [] Irreg  []murmur  []Extremities_ no edema or lesions   [] Extr edema     Neurological:  [x]Normal neuro exam          []Cortical function seems normal  Abnormalities, if present, checked     []Speech __ normal    []    Cranial nerves:    []  []CN 2 VF_ok     []  []Fundi_ normal     []  []CN 3, 4, 6 EOMs_ok   []  []CN 3, pupils_ok   []  []CN 7_no lower face asymmetry  []  []CN 8_hearing _ ok   []  []CN 12 tongue_ok   []    []Motor__ normal all groups   []  []Tone: normal     []  []Reflexes__ normal or unremarkable  []  []Vib Sens_ normal in extr's incl toes  []  []Pin Sens_    []  []Plantars__ flat     []  []Tremor: _ none    []  []Coordination: _ F to N normal  []  []Gait_      []Cane  []Wheelchair  []_______  []Romberg: negative    []Romberg positive     []MMSE; if done:         No data to display                 Neuroimaging:  [x] Images and imaging reports reviewed.  My comments: recent ct head ok  and mri brain ok 2016    Labs:    [x]  New Patient         []  Multiple Issues/ diagnoses or problems  [if not enumerated in note then discussed but not documented]    Complexity of Data:   [x] High    [] Moderate   [x] Images and reports reviewed  [] Other studies reviewed   [] History obtained from accompaniment [x] Differential Diagnoses discussed   [x] Studies considered/ discussed but not ordered  repeat mri    referral to speech therapist for baseline eval   [] Studies ordered     Risks:   [] High     [x] Moderate   [] (poss or definite) neurodegenerative condition [] () autoimmune condition with possibility of flares or unexpected attack  [] () seiz d.o. with possib of recurr seiz's  [] Cerebrovasc ds with risk of recurrent stroke  [] CNS meds (and/or) potentially high risk non CNS meds taken or discussed which may  cause med or behav SE's  [] Fall risk [] Driving discussed  [x] Diagnosis unclear or DDx wide making risk uncertain   []:    MDM:    [x] High    Despite no orders         Assessment/Plan:         ICD-10-CM ICD-9-CM   1. Speech complaints  R47.9 784.59             Other Comments / Follow Up:          Hopeful reassurance       Follow up in about 1 year (around 11/15/2024).    Walter Ferrell MD JATINDER FAAN, [facility]  Neuro[facility] Medical Director   [facility]

## 2023-12-14 ENCOUNTER — OFFICE VISIT (OUTPATIENT)
Dept: INTERNAL MEDICINE | Facility: CLINIC | Age: 33
End: 2023-12-14
Payer: COMMERCIAL

## 2023-12-14 VITALS
SYSTOLIC BLOOD PRESSURE: 122 MMHG | BODY MASS INDEX: 21.82 KG/M2 | HEART RATE: 91 BPM | DIASTOLIC BLOOD PRESSURE: 74 MMHG | HEIGHT: 68 IN | OXYGEN SATURATION: 96 % | RESPIRATION RATE: 16 BRPM | TEMPERATURE: 98 F | WEIGHT: 144 LBS

## 2023-12-14 DIAGNOSIS — F32.0 CURRENT MILD EPISODE OF MAJOR DEPRESSIVE DISORDER WITHOUT PRIOR EPISODE: Primary | ICD-10-CM

## 2023-12-14 PROBLEM — F32.A DEPRESSION: Status: ACTIVE | Noted: 2023-12-14

## 2023-12-14 PROCEDURE — 99214 OFFICE O/P EST MOD 30 MIN: CPT | Mod: ,,, | Performed by: INTERNAL MEDICINE

## 2023-12-14 PROCEDURE — 1159F MED LIST DOCD IN RCRD: CPT | Mod: CPTII,,, | Performed by: INTERNAL MEDICINE

## 2023-12-14 PROCEDURE — 3008F PR BODY MASS INDEX (BMI) DOCUMENTED: ICD-10-PCS | Mod: CPTII,,, | Performed by: INTERNAL MEDICINE

## 2023-12-14 PROCEDURE — 3044F HG A1C LEVEL LT 7.0%: CPT | Mod: CPTII,,, | Performed by: INTERNAL MEDICINE

## 2023-12-14 PROCEDURE — 3074F PR MOST RECENT SYSTOLIC BLOOD PRESSURE < 130 MM HG: ICD-10-PCS | Mod: CPTII,,, | Performed by: INTERNAL MEDICINE

## 2023-12-14 PROCEDURE — 3078F PR MOST RECENT DIASTOLIC BLOOD PRESSURE < 80 MM HG: ICD-10-PCS | Mod: CPTII,,, | Performed by: INTERNAL MEDICINE

## 2023-12-14 PROCEDURE — 99214 PR OFFICE/OUTPT VISIT, EST, LEVL IV, 30-39 MIN: ICD-10-PCS | Mod: ,,, | Performed by: INTERNAL MEDICINE

## 2023-12-14 PROCEDURE — 3078F DIAST BP <80 MM HG: CPT | Mod: CPTII,,, | Performed by: INTERNAL MEDICINE

## 2023-12-14 PROCEDURE — 3074F SYST BP LT 130 MM HG: CPT | Mod: CPTII,,, | Performed by: INTERNAL MEDICINE

## 2023-12-14 PROCEDURE — 3008F BODY MASS INDEX DOCD: CPT | Mod: CPTII,,, | Performed by: INTERNAL MEDICINE

## 2023-12-14 PROCEDURE — 1160F RVW MEDS BY RX/DR IN RCRD: CPT | Mod: CPTII,,, | Performed by: INTERNAL MEDICINE

## 2023-12-14 PROCEDURE — 1160F PR REVIEW ALL MEDS BY PRESCRIBER/CLIN PHARMACIST DOCUMENTED: ICD-10-PCS | Mod: CPTII,,, | Performed by: INTERNAL MEDICINE

## 2023-12-14 PROCEDURE — 3044F PR MOST RECENT HEMOGLOBIN A1C LEVEL <7.0%: ICD-10-PCS | Mod: CPTII,,, | Performed by: INTERNAL MEDICINE

## 2023-12-14 PROCEDURE — 1159F PR MEDICATION LIST DOCUMENTED IN MEDICAL RECORD: ICD-10-PCS | Mod: CPTII,,, | Performed by: INTERNAL MEDICINE

## 2023-12-14 RX ORDER — SERTRALINE HYDROCHLORIDE 25 MG/1
25 TABLET, FILM COATED ORAL DAILY
Qty: 30 TABLET | Refills: 11 | Status: SHIPPED | OUTPATIENT
Start: 2023-12-14 | End: 2024-03-21

## 2023-12-14 NOTE — ASSESSMENT & PLAN NOTE
Zoloft at 25 can increase to 50 mg after 2 weeks  If any worsening of symptoms patient advised to call us immediately to discontinue  If fails Zoloft will try Wellbutrin  Pending new patient appointment with Dr. Allen

## 2023-12-14 NOTE — PROGRESS NOTES
Subjective:      Patient ID: Huyen Dover is a 33 y.o. female.    Chief Complaint: Depression and Anxiety      HPI:  Huyen is a 32-year-old  female here for wellness  She works as a  at [employer]; in documentation.  Otherwise her parents are alive her dad is 62 with prostate cancer and has had prostatectomy, radiation.   Mom has history of hyperlipidemia, alcoholic, sober living  Brother no medical problems  Avni; autonomic dysfunction. She is now on Florinef 0.1 mg once a day as needed  [facility] for Rosacea  Had COVID last year  COVID vaccinated  MCV at 99; drinks 1-2 glasses of wine a week  [facility] for G[facility]    5-2023:  She complains of difficulty with expressive aphasia that she will often have the word and that it on the tip of her tongue with the wrong word will come out.  Her mother is living in a sober living situation  Started methylated folate by her therapist with no improvement; no history of homocystinuria  Birth control was changed during this period of time as well  MRI of the brain last year was unremarkable  MCV is borderline  B12 and folic acid levels are normal last year    Todays information: Depression  Pending visit with Dr. Allen  Has never been on medication but was told by her counselor that it is probably time  She is amenable to moving forward  Denies anxiety she is just tearful and sad    Past Medical History:  Past Medical History:   Diagnosis Date    ADD (attention deficit disorder)     Dizziness      History reviewed. No pertinent surgical history.  Review of patient's allergies indicates:  No Known Allergies  Current Outpatient Medications on File Prior to Visit   Medication Sig Dispense Refill    azelaic acid (AZELEX) 15 % gel Apply topically 2 (two) times a day.      dapsone (ACZONE) 7.5 % GlwP Apply topically once daily.      doxycycline (VIBRAMYCIN) 100 MG Cap Take 100 mg by mouth once daily.      fludrocortisone (FLORINEF) 0.1 mg Tab Take  100 mcg by mouth 2 (two) times daily.      ketoconazole (NIZORAL) 2 % cream Apply topically 2 (two) times a day.      LESSINA 0.1-20 mg-mcg per tablet Take 1 tablet by mouth once daily.       No current facility-administered medications on file prior to visit.     Social History     Socioeconomic History    Marital status:    Tobacco Use    Smoking status: Never    Smokeless tobacco: Never   Substance and Sexual Activity    Alcohol use: Yes     Comment: Occassionally    Drug use: Never    Sexual activity: Yes     Partners: Male     Social Determinants of Health     Financial Resource Strain: Low Risk  (6/28/2023)    Overall Financial Resource Strain (CARDIA)     Difficulty of Paying Living Expenses: Not hard at all   Food Insecurity: No Food Insecurity (6/28/2023)    Hunger Vital Sign     Worried About Running Out of Food in the Last Year: Never true     Ran Out of Food in the Last Year: Never true   Transportation Needs: No Transportation Needs (6/28/2023)    PRAPARE - Transportation     Lack of Transportation (Medical): No     Lack of Transportation (Non-Medical): No   Physical Activity: Sufficiently Active (6/28/2023)    Exercise Vital Sign     Days of Exercise per Week: 3 days     Minutes of Exercise per Session: 60 min   Stress: No Stress Concern Present (6/28/2023)    [employer] [employer] - Occupational Stress Questionnaire     Feeling of Stress : Not at all   Social Connections: Socially Integrated (6/28/2023)    Social Connection and Isolation Panel [NHANES]     Frequency of Communication with Friends and Family: More than three times a week     Frequency of Social Gatherings with Friends and Family: More than three times a week     Attends Jain Services: More than 4 times per year     Active Member of Clubs or Organizations: Yes     Attends Club or Organization Meetings: More than 4 times per year     Marital Status:    Housing Stability: Low Risk  (12/14/2023)     "Housing Stability Vital Sign     Unable to Pay for Housing in the Last Year: No     Number of Places Lived in the Last Year: 1     Unstable Housing in the Last Year: No     Family History   Problem Relation Age of Onset    Alcohol abuse Mother     Depression Mother     Hypertension Father     Hyperlipidemia Father     Prostate cancer Father        Review of Systems  A comprehensive review of systems was performed and was negative with exception of what is documented above.     Objective:   /74 (BP Location: Left arm, Patient Position: Sitting, BP Method: Medium (Manual))   Pulse 91   Temp 97.9 °F (36.6 °C) (Temporal)   Resp 16   Ht 5' 8" (1.727 m)   Wt 65.3 kg (144 lb)   LMP 11/26/2023   SpO2 96%   BMI 21.90 kg/m²   Physical Exam  General : Alert and oriented, No acute distress, afebrile.  Eye : PERRLA. EOMI. Normal conjunctiva, Sclerae are nonicteric.   Integumentary : Warm, moist, intact.  Neurologic : Alert, Oriented  Psychiatric : Cooperative, Appropriate mood & affect.   Assessment/ Plan:   1. Current mild episode of major depressive disorder without prior episode  Assessment & Plan:  Zoloft at 25 can increase to 50 mg after 2 weeks  If any worsening of symptoms patient advised to call us immediately to discontinue  If fails Zoloft will try Wellbutrin  Pending new patient appointment with Dr. Allen      Other orders  -     sertraline (ZOLOFT) 25 MG tablet; Take 1 tablet (25 mg total) by mouth once daily. Call MD after 2-4 weeks to increase the dose  Dispense: 30 tablet; Refill: 11             Follow up in about 6 weeks (around 1/25/2024), or if symptoms worsen or fail to improve, for depression revisit.    "

## 2024-01-31 ENCOUNTER — OFFICE VISIT (OUTPATIENT)
Dept: INTERNAL MEDICINE | Facility: CLINIC | Age: 34
End: 2024-01-31
Payer: COMMERCIAL

## 2024-01-31 VITALS — WEIGHT: 140 LBS | HEIGHT: 68 IN | BODY MASS INDEX: 21.22 KG/M2

## 2024-01-31 DIAGNOSIS — F32.0 CURRENT MILD EPISODE OF MAJOR DEPRESSIVE DISORDER WITHOUT PRIOR EPISODE: ICD-10-CM

## 2024-01-31 DIAGNOSIS — H10.239: Primary | ICD-10-CM

## 2024-01-31 PROBLEM — H10.9 CONJUNCTIVITIS: Status: ACTIVE | Noted: 2024-01-31

## 2024-01-31 PROCEDURE — 1159F MED LIST DOCD IN RCRD: CPT | Mod: CPTII,95,, | Performed by: INTERNAL MEDICINE

## 2024-01-31 PROCEDURE — 99214 OFFICE O/P EST MOD 30 MIN: CPT | Mod: 95,,, | Performed by: INTERNAL MEDICINE

## 2024-01-31 PROCEDURE — 1160F RVW MEDS BY RX/DR IN RCRD: CPT | Mod: CPTII,95,, | Performed by: INTERNAL MEDICINE

## 2024-01-31 PROCEDURE — 3008F BODY MASS INDEX DOCD: CPT | Mod: CPTII,95,, | Performed by: INTERNAL MEDICINE

## 2024-01-31 RX ORDER — MOXIFLOXACIN 5 MG/ML
1 SOLUTION/ DROPS OPHTHALMIC 3 TIMES DAILY
Qty: 3 ML | Refills: 0 | Status: SHIPPED | OUTPATIENT
Start: 2024-01-31 | End: 2024-02-07

## 2024-01-31 NOTE — PROGRESS NOTES
This is a telemedicine note. Patient was treated using telemedicine, real time audio and video, according to [facility] protocols. I, distant provider, conducted the visit from location identified below. The patient participated in the visit at a non-[facility] location selected by the patient (or patients representative), identified below. I am licensed in the state where the patient stated they are located. The patient (or patients representative) stated that they understood and accepted the privacy and security risks to their information at their location.   Patient was located at Home.     I, distant provider, was located at [facility] Internal Medicine.       Internal Medicine    Patient ID: [id number]     Chief Complaint: Depression (6 week f/u)      HPI:     Huyen Dover is a 33 y.o. female here today for a follow up.   She works as a  at [employer]; in documentation.  Otherwise her parents are alive her dad is 62 with prostate cancer and has had prostatectomy, radiation.   Mom has history of hyperlipidemia, alcoholic, sober living  Brother no medical problems  Avni; autonomic dysfunction. She is now on Florinef 0.1 mg once a day as needed  [facility] for Rosacea  Had COVID last year  COVID vaccinated  MCV at 99; drinks 1-2 glasses of wine a week  Mandy Granger for GYN     5-2023:  She complains of difficulty with expressive aphasia that she will often have the word and that it on the tip of her tongue with the wrong word will come out.  Her mother is living in a sober living situation  Started methylated folate by her therapist with no improvement; no history of homocystinuria  Birth control was changed during this period of time as well  MRI of the brain last year was unremarkable  MCV is borderline  B12 and folic acid levels are normal last year     Todays information: Depression  Improved on Zoloft currently on 20/5 she did not have to up titrate to 50 mg  She had a viral infection  around matheus time was seen at the urgent care was treated with Augmentin and ophthalmic tobramycin?  She reports the conjunctivitis was bilateral and crusty with thin drainage most consistent with viral conjunctivitis likely adenovirus.  She now has recurrence of conjunctivitis bilateral within drainage       Past Medical History:   Diagnosis Date    ADD (attention deficit disorder)     Dizziness         Past Surgical History:   Procedure Laterality Date    BREAST SURGERY  Dec 2016    Engancement    COSMETIC SURGERY  Dec 2016    Breast enhancement        Social History     Tobacco Use    Smoking status: Never    Smokeless tobacco: Never   Substance and Sexual Activity    Alcohol use: Yes     Alcohol/week: 2.0 standard drinks of alcohol     Types: 2 Glasses of wine per week     Comment: Occassionally    Drug use: Never    Sexual activity: Yes     Partners: Male     Birth control/protection: OCP        Current Outpatient Medications   Medication Instructions    azelaic acid (AZELEX) 15 % gel Topical, 2 times daily    dapsone (ACZONE) 7.5 % GlwP Topical, Daily    doxycycline (VIBRAMYCIN) 100 mg, Oral, Daily    fludrocortisone (FLORINEF) 100 mcg, Oral, 2 times daily    ketoconazole (NIZORAL) 2 % cream Topical, 2 times daily    LESSINA 0.1-20 mg-mcg per tablet 1 tablet, Oral, Daily    moxifloxacin (VIGAMOX) 0.5 % ophthalmic solution 1 drop, Both Eyes, 3 times daily    sertraline (ZOLOFT) 25 mg, Oral, Daily, Call MD after 2-4 weeks to increase the dose       Review of patient's allergies indicates:  No Known Allergies     Patient Care Team:  Ruddy Tavera MD as PCP - General (Internal Medicine)  Mandy Granger MD as Consulting Physician (Gynecology)  Naveed Holly MD as Consulting Physician (Cardiology)     Subjective:     Review of Systems   Constitutional:  Negative for activity change and unexpected weight change.   HENT:  Negative for hearing loss, rhinorrhea and trouble swallowing.    Eyes:  Positive  "for discharge. Negative for visual disturbance.   Respiratory:  Negative for chest tightness and wheezing.    Cardiovascular:  Negative for chest pain and palpitations.   Gastrointestinal:  Negative for blood in stool, constipation, diarrhea and vomiting.   Endocrine: Negative for polydipsia and polyuria.   Genitourinary:  Negative for difficulty urinating, dysuria, hematuria and menstrual problem.   Musculoskeletal:  Negative for arthralgias, joint swelling and neck pain.   Neurological:  Negative for weakness and headaches.   Psychiatric/Behavioral:  Negative for confusion and dysphoric mood.        12 point review of systems conducted, negative except as stated in the history of present illness. See HPI for details.    Objective:     Visit Vitals  Ht 5' 8" (1.727 m)   Wt 63.5 kg (140 lb)   BMI 21.29 kg/m²       Physical Exam  Constitutional:       Appearance: Normal appearance.   Pulmonary:      Effort: Pulmonary effort is normal.   Neurological:      Mental Status: She is alert.   Psychiatric:         Mood and Affect: Mood normal.         Behavior: Behavior normal.         Labs Reviewed:     Chemistry:  Lab Results   Component Value Date     06/27/2023    K 4.3 06/27/2023    CHLORIDE 107 06/27/2023    BUN 11.9 06/27/2023    CREATININE 0.94 06/27/2023    EGFRNORACEVR >60 06/27/2023    GLUCOSE 83 06/27/2023    CALCIUM 9.3 06/27/2023    ALKPHOS 53 06/27/2023    LABPROT 6.6 06/27/2023    ALBUMIN 4.0 06/27/2023    BILIDIR 0.2 06/15/2020    IBILI 0.30 06/15/2020    AST 19 06/27/2023    ALT 19 06/27/2023    HHMQHTAJ65RW 43.5 06/27/2023    TSH 2.029 06/27/2023        Lab Results   Component Value Date    HGBA1C 4.7 06/27/2023        Hematology:  Lab Results   Component Value Date    WBC 4.87 06/27/2023    HGB 14.4 06/27/2023    HCT 43.6 06/27/2023     06/27/2023       Lipid Panel:  Lab Results   Component Value Date    CHOL 174 06/27/2023    HDL 59 06/27/2023    .00 06/27/2023    TRIG 60 06/27/2023 "    TOTALCHOLEST 3 06/27/2023        Urine:  Lab Results   Component Value Date    COLORUA Dark Yellow 06/27/2023    APPEARANCEUA Cloudy (A) 06/27/2023    SGUA 1.022 06/27/2023    PHUA 5.5 06/27/2023    PROTEINUA Negative 06/27/2023    GLUCOSEUA Negative 06/27/2023    KETONESUA Negative 06/27/2023    BLOODUA Negative 06/27/2023    NITRITESUA Negative 06/27/2023    LEUKOCYTESUR 3+ (A) 06/27/2023    RBCUA <5 06/27/2023    WBCUA 21-50 (A) 06/27/2023    BACTERIA 2+ (A) 06/27/2023        Assessment:       ICD-10-CM ICD-9-CM   1. Serous conjunctivitis, unspecified laterality  H10.239 372.01   2. Current mild episode of major depressive disorder without prior episode  F32.0 296.21        Plan:     1. Serous conjunctivitis, unspecified laterality  Assessment & Plan:    Patient recently treated with tobramycin for conjunctivitis associated with upper respiratory infection back at the end of December suspicious more for adenovirus given her constitutional symptoms.  At this time she has no other constitutional symptoms just bilateral eye discharge, watery drainage suspicious again for more allergy driven conjunctivitis however she has concerns will go ahead and treat with moxifloxacin I did advise her to call me if it recurs, considerations for all antihistamine and topical Pataday        2. Current mild episode of major depressive disorder without prior episode  Assessment & Plan:   Much improved on Zoloft 25 she is pending an appointment with Psychiatry I did advise her over the next several weeks if she feels like the medication is not working we need to increase her to 50.          Other orders  -     moxifloxacin (VIGAMOX) 0.5 % ophthalmic solution; Place 1 drop into both eyes 3 (three) times daily. for 7 days  Dispense: 3 mL; Refill: 0         Follow up in about 6 months (around 7/31/2024) for depression. In addition to their scheduled follow up, the patient has also been instructed to follow up on as needed basis.      Future Appointments   Date Time Provider Department Center   7/1/2024  3:40 PM Ruddy Tavera MD [facility] [id number]   11/15/2024  7:30 AM Walter Ferrell MD [facility] [address] [place] [address]        Ruddy Tavera MD    Face to face time spent with patient exceeds 12 minutes, over 50% of which was used for education and counseling regarding medical conditions, current medications including risk/benefit and side effects/adverse events, over the counter medications-uses/doses, home self-care and contact precautions, and red flags and indications for immediate medical attention. The patient is receptive, expresses understanding and is agreeable to plan. All questions answered

## 2024-01-31 NOTE — ASSESSMENT & PLAN NOTE
Patient recently treated with tobramycin for conjunctivitis associated with upper respiratory infection back at the end of December suspicious more for adenovirus given her constitutional symptoms.  At this time she has no other constitutional symptoms just bilateral eye discharge, watery drainage suspicious again for more allergy driven conjunctivitis however she has concerns will go ahead and treat with moxifloxacin I did advise her to call me if it recurs, considerations for all antihistamine and topical Pataday

## 2024-01-31 NOTE — ASSESSMENT & PLAN NOTE
Much improved on Zoloft 25 she is pending an appointment with Psychiatry I did advise her over the next several weeks if she feels like the medication is not working we need to increase her to 50.

## 2024-03-21 ENCOUNTER — OFFICE VISIT (OUTPATIENT)
Dept: INTERNAL MEDICINE | Facility: CLINIC | Age: 34
End: 2024-03-21
Payer: COMMERCIAL

## 2024-03-21 VITALS
HEIGHT: 68 IN | DIASTOLIC BLOOD PRESSURE: 71 MMHG | TEMPERATURE: 99 F | WEIGHT: 142.5 LBS | SYSTOLIC BLOOD PRESSURE: 108 MMHG | RESPIRATION RATE: 18 BRPM | BODY MASS INDEX: 21.6 KG/M2 | OXYGEN SATURATION: 98 % | HEART RATE: 91 BPM

## 2024-03-21 DIAGNOSIS — F32.0 CURRENT MILD EPISODE OF MAJOR DEPRESSIVE DISORDER WITHOUT PRIOR EPISODE: ICD-10-CM

## 2024-03-21 DIAGNOSIS — B35.1 ONYCHOMYCOSIS: ICD-10-CM

## 2024-03-21 DIAGNOSIS — H10.233 SEROUS CONJUNCTIVITIS OF BOTH EYES: Primary | ICD-10-CM

## 2024-03-21 PROCEDURE — 99214 OFFICE O/P EST MOD 30 MIN: CPT | Mod: ,,, | Performed by: NURSE PRACTITIONER

## 2024-03-21 PROCEDURE — 1159F MED LIST DOCD IN RCRD: CPT | Mod: CPTII,,, | Performed by: NURSE PRACTITIONER

## 2024-03-21 PROCEDURE — 3074F SYST BP LT 130 MM HG: CPT | Mod: CPTII,,, | Performed by: NURSE PRACTITIONER

## 2024-03-21 PROCEDURE — 3008F BODY MASS INDEX DOCD: CPT | Mod: CPTII,,, | Performed by: NURSE PRACTITIONER

## 2024-03-21 PROCEDURE — 1160F RVW MEDS BY RX/DR IN RCRD: CPT | Mod: CPTII,,, | Performed by: NURSE PRACTITIONER

## 2024-03-21 PROCEDURE — 3078F DIAST BP <80 MM HG: CPT | Mod: CPTII,,, | Performed by: NURSE PRACTITIONER

## 2024-03-21 RX ORDER — LEVONORGESTREL AND ETHINYL ESTRADIOL 6-5-10
1 KIT ORAL DAILY
COMMUNITY
Start: 2024-03-19

## 2024-03-21 RX ORDER — SERTRALINE HYDROCHLORIDE 50 MG/1
50 TABLET, FILM COATED ORAL DAILY
COMMUNITY
Start: 2024-03-19

## 2024-03-21 RX ORDER — TERBINAFINE HYDROCHLORIDE 250 MG/1
250 TABLET ORAL DAILY
Qty: 84 TABLET | Refills: 0 | Status: SHIPPED | OUTPATIENT
Start: 2024-03-21 | End: 2024-06-13

## 2024-03-21 NOTE — ASSESSMENT & PLAN NOTE
Continue Zoloft 50mg daily   Educated patient on the risks of serotonin based medications such as serotonin modulators and SSRIs/SNRIs including common side effects of nausea, GI upset, headache dizziness as well as the rare risk for worsening symptoms of depression including development of suicidal thoughts or ideations, and serotonin syndrome.   Exercise daily. Get sunlight daily.  Practice positive phrases and repeat throughout the day, along with yoga and relaxation techniques.  Establish good social support, make changes to reduce stress.  Reports any symptoms of suicidal/homicidal ideations or self harm immediately, if clinic is closed go to nearest emergency room.

## 2024-03-21 NOTE — PROGRESS NOTES
Internal Medicine    Patient ID: [id number]     Chief Complaint: Follow-up      HPI:     Huyen Dover is a 33 y.o. female here today for a follow up. Started on Zoloft about 3 months ago. Continues to tolerate well with improvement of depressive symptoms. Complaining of recurrent bilateral, thick eye drainage despite treating with two antibiotic drops. Improved initially with Tobramycin drops but also received oral antibiotics and steroids for URI around that same time. Trial of treatment with Pataday and Cetirizine for a couple of weeks without any significant improvement. She is a contact lens wearer. Has changed out her mascara/eye makeup brushes but unsure of timing in relation to infection. Denies eye pain or vision changes. Also complaining of toenail fungus to bilateral great toes despite use of topical treatment. No other complaints today.     Past Medical History:   Diagnosis Date    ADD (attention deficit disorder)     Dizziness         Past Surgical History:   Procedure Laterality Date    BREAST SURGERY  Dec 2016    Engancement    COSMETIC SURGERY  Dec 2016    Breast enhancement        Social History     Tobacco Use    Smoking status: Never    Smokeless tobacco: Never   Substance and Sexual Activity    Alcohol use: Yes     Alcohol/week: 2.0 standard drinks of alcohol     Types: 2 Glasses of wine per week     Comment: Occassionally    Drug use: Never    Sexual activity: Yes     Partners: Male     Birth control/protection: OCP        Current Outpatient Medications   Medication Instructions    azelaic acid (AZELEX) 15 % gel Topical, 2 times daily    dapsone (ACZONE) 7.5 % GlwP Topical, Daily    doxycycline (VIBRAMYCIN) 100 mg, Oral, Daily    fludrocortisone (FLORINEF) 100 mcg, Oral, 2 times daily    ketoconazole (NIZORAL) 2 % cream Topical, 2 times daily    sertraline (ZOLOFT) 50 mg, Oral, Daily    terbinafine HCL (LAMISIL) 250 mg, Oral, Daily    TRIVORA, 28, 50-30 (6)/75-40 (5)/125-30(10) per tablet 1  "tablet, Oral, Daily       Review of patient's allergies indicates:  No Known Allergies     Patient Care Team:  Ruddy Tavera MD as PCP - General (Internal Medicine)  Mandy Granger MD as Consulting Physician (Gynecology)  Naveed Holly MD as Consulting Physician (Cardiology)     Subjective:     Review of Systems    12 point review of systems conducted, negative except as stated in the history of present illness. See HPI for details.    Objective:     Visit Vitals  /71 (BP Location: Left arm, Patient Position: Sitting)   Pulse 91   Temp 98.6 °F (37 °C)   Resp 18   Ht 5' 8" (1.727 m)   Wt 64.6 kg (142 lb 8 oz)   SpO2 98%   BMI 21.67 kg/m²       Physical Exam  Vitals and nursing note reviewed.   Constitutional:       General: She is not in acute distress.     Appearance: She is not ill-appearing.   HENT:      Head: Normocephalic and atraumatic.      Mouth/Throat:      Mouth: Mucous membranes are moist.      Pharynx: Oropharynx is clear.   Eyes:      General: Lids are normal. No scleral icterus.     Extraocular Movements: Extraocular movements intact.      Right eye: Normal extraocular motion and no nystagmus.      Left eye: Normal extraocular motion and no nystagmus.      Conjunctiva/sclera:      Right eye: Right conjunctiva is injected. No exudate.     Left eye: Left conjunctiva is injected. No exudate.     Pupils: Pupils are equal, round, and reactive to light.   Cardiovascular:      Rate and Rhythm: Normal rate and regular rhythm.   Pulmonary:      Effort: Pulmonary effort is normal. No respiratory distress.      Breath sounds: Normal breath sounds.   Abdominal:      General: Abdomen is flat. Bowel sounds are normal. There is no distension.      Palpations: Abdomen is soft.   Musculoskeletal:         General: Normal range of motion.      Cervical back: Normal range of motion and neck supple.   Skin:     General: Skin is warm and dry.   Neurological:      General: No focal deficit present.      " Mental Status: She is alert.   Psychiatric:         Mood and Affect: Mood normal.       Assessment:       ICD-10-CM ICD-9-CM   1. Serous conjunctivitis of both eyes  H10.233 372.01   2. Current mild episode of major depressive disorder without prior episode  F32.0 296.21   3. Onychomycosis  B35.1 110.1        Plan:     1. Serous conjunctivitis of both eyes  Assessment & Plan:  Discussed differential including bacterial reinfection vs allergic conjunctivitis  Would like to retreat with antibiotic drops - stressed importance of changing contacts, makeup brushes, etc. 24 hours after antibiotic initiation.  If no improvement, will need to start Pataday + Xyzal + Refresh drops daily as this is likely allergic      2. Current mild episode of major depressive disorder without prior episode  Assessment & Plan:  Continue Zoloft 50mg daily   Educated patient on the risks of serotonin based medications such as serotonin modulators and SSRIs/SNRIs including common side effects of nausea, GI upset, headache dizziness as well as the rare risk for worsening symptoms of depression including development of suicidal thoughts or ideations, and serotonin syndrome.   Exercise daily. Get sunlight daily.  Practice positive phrases and repeat throughout the day, along with yoga and relaxation techniques.  Establish good social support, make changes to reduce stress.  Reports any symptoms of suicidal/homicidal ideations or self harm immediately, if clinic is closed go to nearest emergency room.      3. Onychomycosis  -     terbinafine HCL (LAMISIL) 250 mg tablet; Take 1 tablet (250 mg total) by mouth once daily.  Dispense: 84 tablet; Refill: 0       In addition to their scheduled follow up, the patient has also been instructed to follow up on as needed basis.     Future Appointments   Date Time Provider Department Center   7/1/2024  3:40 PM Ruddy Tavera MD [facility] [id number]   11/15/2024  7:30 AM Walter Ferrell MD [facility]  [address], FNP

## 2024-03-21 NOTE — ASSESSMENT & PLAN NOTE
Discussed differential including bacterial reinfection vs allergic conjunctivitis  Would like to retreat with antibiotic drops - stressed importance of changing contacts, makeup brushes, etc. 24 hours after antibiotic initiation.  If no improvement, will need to start Pataday + Xyzal + Refresh drops daily as this is likely allergic

## 2024-03-26 ENCOUNTER — TELEPHONE (OUTPATIENT)
Dept: INTERNAL MEDICINE | Facility: CLINIC | Age: 34
End: 2024-03-26
Payer: COMMERCIAL

## 2024-03-26 NOTE — TELEPHONE ENCOUNTER
Liliana Jose Staff  Caller: Unspecified (Today,  2:01 PM)  Type:  Patient Returning Call    Who Called:pt  Who Left Message for Patient:holley  Does the patient know what this is regarding?:  Would the patient rather a call back or a response via [facility]? C/b  Best Call Back Number:[phone]    Additional Information:

## 2024-03-26 NOTE — TELEPHONE ENCOUNTER
----- Message from Lam Dover sent at 3/26/2024 12:31 PM CDT -----  .Type:  Needs Medical Advice    Who Called: Huyen  Symptoms (please be specific):    How long has patient had these symptoms:    Pharmacy name and phone #:    Would the patient rather a call back or a response via [facility]?   Best Call Back Number: [phone]  Additional Information: Requested a call back she had some information for Ms. Yosvany to give her.

## 2024-03-26 NOTE — TELEPHONE ENCOUNTER
Spoke to pt who stated that the eye drops are not working. She stated that it was mentioned that she might need to start an allergy regimen, but does not know what to take. She also noted that she does not know how long she needs to take the medication for her toenail fungus (Terbinafine)

## 2024-03-27 NOTE — TELEPHONE ENCOUNTER
"Per IMELDA Castaneda, "I dispensed the quantity needed for the toenail fungus (12 weeks worth). Allergy regimen should entail refresh drops TID, Xyzal 10mg daily, and Flonase twice daily. Couldn't hurt to get appt with ophthalmology as well for routine eval."  "

## 2024-06-10 ENCOUNTER — TELEPHONE (OUTPATIENT)
Dept: INTERNAL MEDICINE | Facility: CLINIC | Age: 34
End: 2024-06-10
Payer: COMMERCIAL

## 2024-06-10 NOTE — TELEPHONE ENCOUNTER
----- Message from Carlitos Coughlin MA sent at 6/10/2024  8:28 AM CDT -----  Regarding: PV 7/1/24 @ 3:40 Dr. Lora  1. Are there any outstanding tasks in the patient's chart? Yes, fasting labs    2. Is there any documentation in the chart? No    3.Has patient been seen in an ER, Urgent care clinic, or been admitted since last visit?  If yes, When, where, and why    4. Has patient seen any other healthcare providers since last visit?  If yes, when, where, and why    5. Has patient had any bloodwork or XR done since last visit?    6. Is patient signed up for patient portal?